# Patient Record
Sex: FEMALE | Race: WHITE | NOT HISPANIC OR LATINO | URBAN - METROPOLITAN AREA
[De-identification: names, ages, dates, MRNs, and addresses within clinical notes are randomized per-mention and may not be internally consistent; named-entity substitution may affect disease eponyms.]

---

## 2017-05-23 ENCOUNTER — OFFICE VISIT - HEALTHEAST (OUTPATIENT)
Dept: FAMILY MEDICINE | Facility: CLINIC | Age: 27
End: 2017-05-23

## 2017-05-23 DIAGNOSIS — N63.15 BREAST LUMP ON RIGHT SIDE AT 9 O'CLOCK POSITION: ICD-10-CM

## 2017-05-23 ASSESSMENT — MIFFLIN-ST. JEOR: SCORE: 1321.43

## 2017-06-12 ENCOUNTER — OFFICE VISIT - HEALTHEAST (OUTPATIENT)
Dept: FAMILY MEDICINE | Facility: CLINIC | Age: 27
End: 2017-06-12

## 2017-06-12 DIAGNOSIS — Z01.419 WELL FEMALE EXAM WITH ROUTINE GYNECOLOGICAL EXAM: ICD-10-CM

## 2017-06-12 DIAGNOSIS — N63.15 BREAST LUMP ON RIGHT SIDE AT 9 O'CLOCK POSITION: ICD-10-CM

## 2017-06-12 ASSESSMENT — MIFFLIN-ST. JEOR: SCORE: 1314.06

## 2017-06-13 ENCOUNTER — COMMUNICATION - HEALTHEAST (OUTPATIENT)
Dept: FAMILY MEDICINE | Facility: CLINIC | Age: 27
End: 2017-06-13

## 2017-06-13 ENCOUNTER — HOSPITAL ENCOUNTER (OUTPATIENT)
Dept: MAMMOGRAPHY | Facility: HOSPITAL | Age: 27
Discharge: HOME | End: 2017-06-13
Attending: NURSE PRACTITIONER

## 2017-06-13 DIAGNOSIS — N63.15 BREAST LUMP ON RIGHT SIDE AT 9 O'CLOCK POSITION: ICD-10-CM

## 2017-06-14 ENCOUNTER — HOSPITAL ENCOUNTER (OUTPATIENT)
Dept: MAMMOGRAPHY | Facility: HOSPITAL | Age: 27
Discharge: HOME | End: 2017-06-14
Attending: NURSE PRACTITIONER

## 2017-06-14 DIAGNOSIS — N63.15 BREAST LUMP ON RIGHT SIDE AT 9 O'CLOCK POSITION: ICD-10-CM

## 2017-06-15 ENCOUNTER — COMMUNICATION - HEALTHEAST (OUTPATIENT)
Dept: ONCOLOGY | Facility: HOSPITAL | Age: 27
End: 2017-06-15

## 2017-06-16 ENCOUNTER — COMMUNICATION - HEALTHEAST (OUTPATIENT)
Dept: FAMILY MEDICINE | Facility: CLINIC | Age: 27
End: 2017-06-16

## 2017-06-16 ENCOUNTER — COMMUNICATION - HEALTHEAST (OUTPATIENT)
Dept: ONCOLOGY | Facility: HOSPITAL | Age: 27
End: 2017-06-16

## 2017-06-19 ENCOUNTER — COMMUNICATION - HEALTHEAST (OUTPATIENT)
Dept: ONCOLOGY | Facility: HOSPITAL | Age: 27
End: 2017-06-19

## 2017-06-20 ENCOUNTER — OFFICE VISIT - HEALTHEAST (OUTPATIENT)
Dept: SURGERY | Facility: CLINIC | Age: 27
End: 2017-06-20

## 2017-06-20 DIAGNOSIS — C50.811 MALIGNANT NEOPLASM OF OVERLAPPING SITES OF RIGHT FEMALE BREAST (H): ICD-10-CM

## 2017-06-21 ENCOUNTER — COMMUNICATION - HEALTHEAST (OUTPATIENT)
Dept: ONCOLOGY | Facility: HOSPITAL | Age: 27
End: 2017-06-21

## 2017-06-22 LAB

## 2017-06-23 ENCOUNTER — OFFICE VISIT - HEALTHEAST (OUTPATIENT)
Dept: ONCOLOGY | Facility: HOSPITAL | Age: 27
End: 2017-06-23

## 2017-06-23 ENCOUNTER — COMMUNICATION - HEALTHEAST (OUTPATIENT)
Dept: ONCOLOGY | Facility: HOSPITAL | Age: 27
End: 2017-06-23

## 2017-06-23 DIAGNOSIS — C50.911 MALIGNANT NEOPLASM OF RIGHT FEMALE BREAST, UNSPECIFIED SITE OF BREAST: ICD-10-CM

## 2017-06-26 ENCOUNTER — COMMUNICATION - HEALTHEAST (OUTPATIENT)
Dept: FAMILY MEDICINE | Facility: CLINIC | Age: 27
End: 2017-06-26

## 2017-06-26 DIAGNOSIS — R87.610 ASCUS OF CERVIX WITH NEGATIVE HIGH RISK HPV: ICD-10-CM

## 2017-06-26 LAB
HPV INTERPRETATION - HISTORICAL: NORMAL
HPV INTERPRETER - HISTORICAL: NORMAL

## 2017-07-05 ENCOUNTER — COMMUNICATION - HEALTHEAST (OUTPATIENT)
Dept: ONCOLOGY | Facility: HOSPITAL | Age: 27
End: 2017-07-05

## 2017-07-05 ENCOUNTER — AMBULATORY - HEALTHEAST (OUTPATIENT)
Dept: ONCOLOGY | Facility: HOSPITAL | Age: 27
End: 2017-07-05

## 2017-07-14 ENCOUNTER — RECORDS - HEALTHEAST (OUTPATIENT)
Dept: ADMINISTRATIVE | Facility: OTHER | Age: 27
End: 2017-07-14

## 2017-07-21 ENCOUNTER — RECORDS - HEALTHEAST (OUTPATIENT)
Dept: ADMINISTRATIVE | Facility: OTHER | Age: 27
End: 2017-07-21

## 2017-07-31 ENCOUNTER — RECORDS - HEALTHEAST (OUTPATIENT)
Dept: ADMINISTRATIVE | Facility: OTHER | Age: 27
End: 2017-07-31

## 2017-08-27 ENCOUNTER — RECORDS - HEALTHEAST (OUTPATIENT)
Dept: ADMINISTRATIVE | Facility: OTHER | Age: 27
End: 2017-08-27

## 2017-09-12 ENCOUNTER — RECORDS - HEALTHEAST (OUTPATIENT)
Dept: ADMINISTRATIVE | Facility: OTHER | Age: 27
End: 2017-09-12

## 2018-01-03 ENCOUNTER — RECORDS - HEALTHEAST (OUTPATIENT)
Dept: ADMINISTRATIVE | Facility: OTHER | Age: 28
End: 2018-01-03

## 2018-02-12 ENCOUNTER — RECORDS - HEALTHEAST (OUTPATIENT)
Dept: ADMINISTRATIVE | Facility: OTHER | Age: 28
End: 2018-02-12

## 2018-02-15 ENCOUNTER — COMMUNICATION - HEALTHEAST (OUTPATIENT)
Dept: FAMILY MEDICINE | Facility: CLINIC | Age: 28
End: 2018-02-15

## 2018-02-15 ENCOUNTER — OFFICE VISIT - HEALTHEAST (OUTPATIENT)
Dept: FAMILY MEDICINE | Facility: CLINIC | Age: 28
End: 2018-02-15

## 2018-02-15 ENCOUNTER — COMMUNICATION - HEALTHEAST (OUTPATIENT)
Dept: NURSING | Facility: CLINIC | Age: 28
End: 2018-02-15

## 2018-02-15 DIAGNOSIS — Z12.4 SCREENING FOR CERVICAL CANCER: ICD-10-CM

## 2018-02-15 DIAGNOSIS — S69.92XA JAMMED INTERPHALANGEAL JOINT OF FINGER OF LEFT HAND, INITIAL ENCOUNTER: ICD-10-CM

## 2018-02-15 RX ORDER — ACETAMINOPHEN 500 MG
1000 TABLET ORAL
Status: SHIPPED | COMMUNITY
Start: 2018-02-15

## 2018-02-15 ASSESSMENT — MIFFLIN-ST. JEOR: SCORE: 1320.86

## 2018-02-19 ENCOUNTER — RECORDS - HEALTHEAST (OUTPATIENT)
Dept: ADMINISTRATIVE | Facility: OTHER | Age: 28
End: 2018-02-19

## 2018-02-19 LAB
BKR LAB AP ABNORMAL BLEEDING: NO
BKR LAB AP BIRTH CONTROL/HORMONES: NORMAL
BKR LAB AP CERVICAL APPEARANCE: NORMAL
BKR LAB AP GYN ADEQUACY: NORMAL
BKR LAB AP GYN INTERPRETATION: NORMAL
BKR LAB AP HPV REFLEX: NORMAL
BKR LAB AP LMP: NORMAL
BKR LAB AP PATIENT STATUS: NORMAL
BKR LAB AP PREVIOUS ABNORMAL: NORMAL
BKR LAB AP PREVIOUS NORMAL: 2015
HIGH RISK?: NO
PATH REPORT.COMMENTS IMP SPEC: NORMAL
RESULT FLAG (HE HISTORICAL CONVERSION): NORMAL

## 2018-02-28 LAB
LAB AP CHARGES (HE HISTORICAL CONVERSION): ABNORMAL
PATH REPORT.ADDENDUM SPEC: ABNORMAL
PATH REPORT.COMMENTS IMP SPEC: ABNORMAL
PATH REPORT.COMMENTS IMP SPEC: ABNORMAL
PATH REPORT.FINAL DX SPEC: ABNORMAL
PATH REPORT.GROSS SPEC: ABNORMAL
PATH REPORT.MICROSCOPIC SPEC OTHER STN: ABNORMAL
PATH REPORT.MICROSCOPIC SPEC OTHER STN: ABNORMAL
PATH REPORT.RELEVANT HX SPEC: ABNORMAL
PATHOLOGY SYNOPTIC REPORT: ABNORMAL
RESULT FLAG (HE HISTORICAL CONVERSION): ABNORMAL

## 2018-05-15 ENCOUNTER — RECORDS - HEALTHEAST (OUTPATIENT)
Dept: ADMINISTRATIVE | Facility: OTHER | Age: 28
End: 2018-05-15

## 2018-09-10 ENCOUNTER — OFFICE VISIT - HEALTHEAST (OUTPATIENT)
Dept: FAMILY MEDICINE | Facility: CLINIC | Age: 28
End: 2018-09-10

## 2018-09-10 DIAGNOSIS — N89.8 VAGINAL ITCHING: ICD-10-CM

## 2018-09-10 DIAGNOSIS — C50.811 MALIGNANT NEOPLASM OF OVERLAPPING SITES OF RIGHT FEMALE BREAST (H): ICD-10-CM

## 2018-09-10 LAB
CLUE CELLS: NORMAL
TRICHOMONAS, WET PREP: NORMAL
YEAST, WET PREP: NORMAL

## 2018-09-10 RX ORDER — TAMOXIFEN CITRATE 20 MG/1
20 TABLET ORAL
Status: SHIPPED | COMMUNITY
Start: 2018-05-20

## 2018-09-10 ASSESSMENT — MIFFLIN-ST. JEOR: SCORE: 1326.3

## 2018-09-11 LAB
C TRACH DNA SPEC QL PROBE+SIG AMP: NEGATIVE
N GONORRHOEA DNA SPEC QL NAA+PROBE: NEGATIVE

## 2018-09-12 LAB — BACTERIA SPEC CULT: NORMAL

## 2019-03-28 ENCOUNTER — RECORDS - HEALTHEAST (OUTPATIENT)
Dept: ADMINISTRATIVE | Facility: OTHER | Age: 29
End: 2019-03-28

## 2019-08-15 ENCOUNTER — RECORDS - HEALTHEAST (OUTPATIENT)
Dept: ADMINISTRATIVE | Facility: OTHER | Age: 29
End: 2019-08-15

## 2019-12-30 ENCOUNTER — RECORDS - HEALTHEAST (OUTPATIENT)
Dept: ADMINISTRATIVE | Facility: OTHER | Age: 29
End: 2019-12-30

## 2020-03-12 ENCOUNTER — RECORDS - HEALTHEAST (OUTPATIENT)
Dept: ADMINISTRATIVE | Facility: OTHER | Age: 30
End: 2020-03-12

## 2021-01-28 ENCOUNTER — AMBULATORY - HEALTHEAST (OUTPATIENT)
Dept: NURSING | Facility: CLINIC | Age: 31
End: 2021-01-28

## 2021-02-18 ENCOUNTER — AMBULATORY - HEALTHEAST (OUTPATIENT)
Dept: NURSING | Facility: CLINIC | Age: 31
End: 2021-02-18

## 2021-05-30 VITALS — HEIGHT: 66 IN | BODY MASS INDEX: 20.89 KG/M2 | WEIGHT: 130 LBS

## 2021-05-31 VITALS — HEIGHT: 66 IN | WEIGHT: 127.5 LBS | BODY MASS INDEX: 20.49 KG/M2

## 2021-05-31 VITALS — HEIGHT: 66 IN | BODY MASS INDEX: 20.73 KG/M2 | WEIGHT: 129 LBS

## 2021-05-31 VITALS — WEIGHT: 129 LBS | BODY MASS INDEX: 20.66 KG/M2

## 2021-06-01 VITALS — HEIGHT: 66 IN | BODY MASS INDEX: 20.93 KG/M2 | WEIGHT: 130.2 LBS

## 2021-06-10 NOTE — PROGRESS NOTES
"  Assessment/Plan:         1. Breast lump on right side at 9 o'clock position      breast lump located on the right outer side of the right breast in the 9 o'clock position it is mobile and firm and round may be slightly met rubbery as well.  Approximately 2 cm x 2 cm.  Likely glandular hormonal change in nature.  Since this was just found after her menstrual cycle I discussed with the patient that it is worthwhile giving it 1 month to see if the lump will change.  Incidentally she is due for an annual exam and this can be reassessed at that time.  I discussed with the patient that further workup of this would include a breast ultrasound.  She is comfortable waiting 1 month to see if any changes to the lump occur.  Patient may certainly follow-up sooner as needed.        Subjective:      Kiersten Parks is a 26 y.o. female who presents for lump in her right breast.  She found this lump 5 days ago when doing a self breast exam.  She reports that she does self breast exams on a irregular basis however does try to do them every couple months.  She noted that she felt a harder spot on the right outside edge of her right breast. No pain associated with this. It was never noticed before.   No other tissue changes, skin dimpling, or other external skin changed noted. The lump moves with palpation.     The following portions of the patient's history were reviewed and updated as appropriate: allergies, current medications and problem list.    Review of Systems   Pertinent items are noted in HPI.      Objective:      /78 (Patient Site: Right Arm, Patient Position: Sitting, Cuff Size: Adult Regular)  Pulse 60  Resp 16  Ht 5' 6\" (1.676 m)  Wt 130 lb (59 kg)  BMI 20.98 kg/m2    General appearance: alert, appears stated age and cooperative  Head: Normocephalic, without obvious abnormality, atraumatic  Breasts: No nipple retraction or dimpling, No nipple discharge or bleeding, No axillary or supraclavicular " adenopathy, positive findings: smooth, round, firm, mobile and non-tender nodule located on the right upper outer quadrant

## 2021-06-11 NOTE — PROGRESS NOTES
Assessment/ Plan:      1. Well female exam with routine gynecological exam  Healthy female exam completed today.  Labs were not completed today. She was encouraged to continue with her healthy lifestyle modifications of getting regular exercise and eating a balanced diet.  Pap smear was updated today despite her having relatively normal Pap last year.  Last use was abnormal stating reparative changes.  Patient does not have any history of gynecologic procedures besides Paps so will recheck Pap today to see if same result is present.  I discussed self breast exams and how to complete these.  She was encouraged to follow-up in 1 year for annual exam or sooner as needed.  - Gynecologic Cytology (PAP Smear)    2. Breast lump on right side at 9 o'clock position  Breast lump that was present 1 month ago continues to be present in the same size.  I would recommend ultrasound to have this assessed further.  This was ordered today. See my last note.   - US Breast Limited (Focal) Right; Future    3. Contraception  Patient was given a refill of her oral contraception today.  - norgestimate-ethinyl estradiol (TRI-SPRINTEC, 28,) 0.18/0.215/0.25 mg-35 mcg (28) Tab tablet; Take 1 tablet by mouth daily.  Dispense: 90 tablet; Refill: 4    Subjective:       Kiersten Parks is a 26 y.o. female who presents for an annual exam.  The patient is sexually active. The patient participates in regular exercise: yes. The patient reports that there is not domestic violence in her life. Breast lump on right side is the same as when last evaluated in May. No change in size or consistency.      Healthy Habits:   Regular Exercise: Yes  Sunscreen Use: Yes  Healthy Diet: Yes  Dental Visits Regularly: No  Seat Belt: Yes  Sexually active: Yes  Self Breast Exam Monthly:Yes  Lipid Profile: No  Glucose Screen: No  Prevention of Osteoporosis: No  Last Dexa: N/A  Guns at Home:  No         Gynecologic History  Patient's last menstrual period was  "2017.  Contraception: OCP (estrogen/progesterone)  Last Pap: . Results were: abnormal    OB History    Para Term  AB Living   0 0       SAB TAB Ectopic Multiple Live Births                    Current Outpatient Prescriptions   Medication Sig Dispense Refill     norgestimate-ethinyl estradiol (TRI-SPRINTEC, 28,) 0.18/0.215/0.25 mg-35 mcg (28) Tab tablet Take 1 tablet by mouth daily. 90 tablet 4     No current facility-administered medications for this visit.      History reviewed. No pertinent past medical history.  History reviewed. No pertinent surgical history.  Review of patient's allergies indicates no known allergies.  Family History   Problem Relation Age of Onset     Diabetes Paternal Grandmother      Cancer Paternal Grandfather      Social History     Social History     Marital status:      Spouse name: N/A     Number of children: 0     Years of education: N/A     Occupational History     childcare center/[email or web address]     Social History Main Topics     Smoking status: Never Smoker     Smokeless tobacco: Not on file     Alcohol use Not on file     Drug use: Not on file     Sexual activity: Yes     Partners: Male     Birth control/ protection: OCP     Other Topics Concern     Not on file     Social History Narrative       Review of Systems  General:  Denies problem  Eyes: Denies problem  Ears/Nose/Throat: Denies problem  Cardiovascular: Denies problem  Respiratory:  Denies problem  Gastrointestinal:  Denies problem  Genitourinary: Denies problem  Musculoskeletal:  Denies problem  Skin: Denies problem  Neurologic: Denies problem  Psychiatric: Denies problem  Endocrine: Denies problem  Heme/Lymphatic: Denies problem   Allergic/Immunologic: Denies problem         Objective:         Vitals:    17 0852   BP: 110/78   Pulse: 72   Resp: 16   Weight: 127 lb 8 oz (57.8 kg)   Height: 5' 6.25\" (1.683 m)     Physical Exam:  General Appearance: Alert, cooperative, no distress, " appears stated age  Head: Normocephalic, without obvious abnormality, atraumatic  Eyes: PERRL, conjunctiva/corneas clear, EOM's intact  Ears: Normal TM's and external ear canals, both ears  Nose: Nares normal, septum midline,mucosa normal, no drainage  Throat: Lips, mucosa, and tongue normal; teeth and gums normal  Neck: Supple, symmetrical, trachea midline, no adenopathy;  thyroid: not enlarged, symmetric, no tenderness/mass/nodules  Back: Symmetric, no curvature, ROM normal, no CVA tenderness  Lungs: Clear to auscultation bilaterally, respirations unlabored  Breasts: Breasts: No nipple retraction or dimpling, No nipple discharge or bleeding, No axillary or supraclavicular adenopathy, positive findings: smooth, round, firm, mobile and non-tender nodule located on the right upper outer quadrant. Size 2cm x 2cm  No breast masses, tenderness, asymmetry, or nipple discharge.  Heart: Regular rate and rhythm, S1 and S2 normal, no murmur, rub, or gallop  Abdomen: Soft, non-tender, bowel sounds active all four quadrants,  no masses, no organomegaly  Pelvic:Normally developed genitalia with no external lesions or eruptions. Vagina and cervix show no lesions, inflammation, discharge or tenderness. No cystocele, No rectocele. Uterus    Non-tender, mobile.  No adnexal mass or tenderness.  Extremities: Extremities normal, atraumatic, no cyanosis or edema  Skin: Skin color, texture, turgor normal, no rashes or lesions  Lymph nodes: Cervical, supraclavicular, and axillary nodes normal  Neurologic: Normal     Results from One year ago. Will repeat PAP this year.     Results for orders placed or performed in visit on 04/18/16   Gynecologic Cytology (PAP Smear)   Result Value Ref Range    Case Report       Gynecologic Cytology Report                       Case: [id number]                                   Authorizing Provider:  Amber Price CNP   Collected:           04/18/2016 0910              Ordering Location:      [facility]    Received:            04/18/2016 0910                                     Medicine                                                                     First Screen:          Ciera Garza, CT (ASCP)                                                     Pathologist:           Pb Carcamo MD                                                         Specimen:    SUREPATH PAP, SCREENING, Endocervical/cervical                                             Interpretation       Negative for squamous intraepithelial lesion or malignancy    Result Flag Abnormal (!) Normal    Other Findings Reparative changes      Specimen Adequacy       Satisfactory for evaluation, endocervical/transformation zone component present    HPV Reflex? Yes if ASCUS     HIGH RISK No     LMP/Menopause Date 4/12/2016     Abnormal Bleeding No     Pt Status N/A     Birth Control/Hormones Pill/patch/ring     Previous Normal/Date 2012     Prev Abn Date/Dx none     Cervical Appearance normal

## 2021-06-11 NOTE — PROGRESS NOTES
This is a 26 y.o.  female who I'm asked to see by Amber Price CNP for evaluation of a right breast cancer.  This was picked up by the patient.  When they did her ultrasound however they could see at least one additional lesion if not another one.  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  It is estrogen receptor positive, progesterone receptor positive and HER-2 negative.    She has no family history of breast cancer.      PAST MEDICAL HISTORY:  Past Medical History:   Diagnosis Date     Breast cancer 06/2017    right breast     Past Surgical History:   Procedure Laterality Date     TONSILLECTOMY AND ADENOIDECTOMY       WISDOM TOOTH EXTRACTION         Medications:    Current Outpatient Prescriptions:      norgestimate-ethinyl estradiol (TRI-SPRINTEC, 28,) 0.18/0.215/0.25 mg-35 mcg (28) Tab tablet, Take 1 tablet by mouth daily., Disp: 90 tablet, Rfl: 4    Allergies:  No Known Allergies    Social History:   reports that she has never smoked. She has never used smokeless tobacco. She reports that she drinks about 1.8 oz of alcohol per week  She reports that she does not use illicit drugs.    ROS:  A 12 point comprehensive review of systems was negative except as noted.    Physical Exam  /66 (Patient Site: Left Arm, Patient Position: Sitting, Cuff Size: Adult Regular)  Pulse 92  Wt 129 lb (58.5 kg)  LMP 05/29/2017  SpO2 95%  BMI 20.66 kg/m2  General:alert, appears stated age and cooperative  Lungs:clear to auscultation bilaterally  CV:regular rate and rhythm, S1, S2 normal, no murmur, click, rub or gallop  Breasts: Palpable mass in the upper outer quadrant of the right breast.  It is very ill-defined and very hard to put a size to it.  I do not feel any other lesions.  There are no skin lesions that I can feel.  No palpable masses on the left.  Lymph Nodes:no axillary nodes palpated  Neuro:Grossly normal      Reviewed her mammograms and ultrasound and pathology.     Impression:  Right Breast Cancer.  Discussed the surgical options for treatment of breast cancer which generally are a lumpectomy (partial mastectomy) combined with radiation versus a mastectomy.  Explained that the survival benefit is the same for both.  The difference is in local recurrence risk.  The patient is a Poor candidate for a lumpectomy.  I explained that because of the multifocal nature and her young age I think she would benefit most by a mastectomy.  I told her she really wanted to try lumpectomy we could but I would be afraid of what her cosmetic outcome would be be and I think her long-term risk of local recurrence is at least 20% or more.  She is already thought about it and she knows at this point that she would best be served with a mastectomy.  Talked a lot about the options for reconstruction.  I have given her the names of plastic surgeons and she is going to get in contact with them soon.  Discussed SLN biopsy.  The procedure and rationale were explained.  Explained that if I did find a node that was positive I would need to take out more lymph nodes.  Discussed that at this point we do not know yet whether or not she will need chemotherapy and we may not know until we get all of the results of surgery back.  Despite her age there is a chance that she will not need chemotherapy but she will at least need hormone therapy and explained to her that as bad as chemotherapy sounds, hormone therapy is not a walk in the park for someone her age either.  We will wait until we get all the results from surgery before sending her onto oncology.      Plan: We'll schedule for a right total mastectomy with sentinel lymph node biopsy.  She would like to combine that with immediate reconstruction which I think is very reasonable.    This is typically an overnight stay in the hospital.  The risks and benefits of surgery were explained.  Also talked about expected recovery time.  Have also scheduled for an MRI to get a  better look at both breasts and have referred to genetics for genetic testing.

## 2021-06-11 NOTE — PROGRESS NOTES
[facility] GENETIC COUNSELING: CONSULT SUMMARY  Patient: Kiersten Parks (1990 / 26 y.o., female) MRN: [id number]   [address]  [facility] [address]      Date/Location of Visit: 6/23/2017, [facility]  Care Provider: Nhi Howard MS, [facility] ([phone], [email or web address])  Referring Provider: Yolie Muñoz MD  Present: Kiersten and her  Elder    PRESENTING CONCERN: Recently diagnosed breast cancer at age 26    MEDICAL: Kiersten had ultrasound and biopsy of two areas in the right breast last week.  Pathology confirms invasive ductal carcinoma with associated DCIS, grade 2, estrogen and progesterone receptors positive and HER2/giulia-negative.  She met with Dr. Muñoz on 6/20/2017 and was recommended right mastectomy due to multifocal disease and young age.  She was able to meet with Dr. Rubio in plastic surgery already as well.  Kiersten is scheduled for a second surgical opinion with Dr. Fraire at [facility] and bilateral breast MRI here on 6/30/17.      Other cancer screening: No colonoscopy to date.    Hormone/reproductive: Uterus and ovaries remain in place.  No pregnancy history.  Interested in fertility preservation.    Exposures: No history of smoking.      Prior genetic testing: None.    No prior transplant or recent transfusion.    FAMILY: Three-generation pedigree was taken to assess cancer risk using information provided by Kiersten and is scanned into her chart.        Significant for: Paternal grandfather was diagnosed with prostate cancer in his late 60s.  The paternal family history is otherwise notable for being predominantly male.  Kiersten has 4 younger brothers and 4 paternal uncles; no sisters and only one paternal aunt.  Kiersten does have some female paternal first-cousins but she is actually the oldest of her cousins, the rest range in age from 11 to early 20s.  No family history is known beyond her paternal grandparents; her grandmother was adopted and  her grandfather came from a large family but Kiersten is not familiar with those relatives.    Genetic testing: None.    The importance of accurate and verified history was emphasized.  In evaluating Kiersten's personal and family history we discussed differences between sporadic, environmental, and genetic contributions to cancer risk including relevant genetic principals and inheritance patterns.  I emphasized that the factors underlying cancer diagnosis are often multiple and complex.      ASSESSMENT:  Kiersten meets NCCN guidelines for hereditary cancer testing including BRCA1/2 based on her young age and limited paternal family structure.  This can be done in the context of a multi-gene panel to include analysis of other relevant breast cancer genes.  Stat turnaround is available for a panel of 9 genes most strongly linked to breast cancer and most pertinent to surgical planning.     USE OF RESULTS:  We reviewed likelihood of possible results of genetic testing: positive, negative, and a variant of uncertain significance along with medical and social/psychological considerations. We reviewed cancer risks associated with hereditary predisposition and options for prevention and/or heightened surveillance if positive.  For example, women with inherited risk for breast cancer generally have a higher than average chance to develop a second primary (new) breast cancer following initial diagnosis and would be offered the option of bilateral mastectomy or screening with breast MRIs and mammograms.  Results are therefore relevant to Kiersten's upcoming breast surgery.  We discussed the difference between risk of recurrence versus risk of a second primary breast cancer, with testing to inform the latter but not the former.    With panel testing results may identify risks across a spectrum of cancer types and of varying magnitude including those that would be unexpected in the context of Kiersten's personal/family history.  Use of  results may be limited in the case of ambiguous findings, findings in poorly characterized genes, or involving cancers for which prevention or surveillance is not possible.  Risks and recommendations associated with both new and established genes may evolve over time.  Limitations of a negative result were emphasized including possibility of risk being linked to one or more other genes or mutations not detected by current methodologies.  She understands that given her age, concern for an underlying but unidentified genetic risk will remain even with negative results.    FAMILY:  Reviewed autosomal dominant inheritance.  If a mutation is identified targeted testing will be available to relatives and the importance of Kiersten sharing this with them was emphasized.  There would be a 50% chance of passing on a mutation in any future pregnancy.      PSYCHOSOCIAL/DECISION-MAKING:  Kiersten expressed no hesitations or concerns as it pertains to genetic testing or her ability to cope with results.  We discussed resources available to her at [facility].  She is interested in fertility preservation and has already obtained resources from Jenny Page.  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      OTHER: Collection or assessment of other aspects of the family history not related to cancer risk were beyond the scope of our discussion today.    PLAN: Kiersten opted to pursue genetic testing for hereditary cancer today.       Test: 9-gene STAT breast cancer panel (BRCA1, BRCA2, PTEN, TP53, STK11, CDH1, SAMMY, CHEK2, PALB2)    Consent: verbal and written    Sample: saliva    Laboratory: [facility]    Results: expected in 5-12 days and disclosure planned by phone; Kiersten is encouraged to contact me in the event of delays    It was a pleasure to meet Kiersten today and to  participate in her care.  I am of course happy to address any follow-up questions/concerns should these arise for Kiersten or her providers. Kiersten should contact our office at least annually for updates or with changes to personal/family history as the clinic does not routinely re-contact individual patients with an increase or change in knowledge.            RAMIN LAWRENCE MS, [facility]  Certified Genetic Counselor  [facility]    Handouts or Enclosures:  contact information    Face-to-face time: 60 minutes    cc:   MD Amber Clinton CNP Tracy Kayan, MD Natasha Rueth, MD

## 2021-06-11 NOTE — PROGRESS NOTES
[facility] GENETIC COUNSELING: RESULT SUMMARY  Patient: Kiersten Parks (1990 / 26 y.o., female)   MRN: [id number]   [address]  [facility] [address]    Results Disclosure On: 7/5/2017    Method of Disclosure: telephone   Care Provider: Nhi Howard MS, [facility] [place] ([phone], [email or web address])    GENETIC TEST RESULT:  NEGATIVE (no mutations or uncertain variants identified)    TEST PERFORMED: 9-gene breast cancer STAT panel  LABORATORY: [employer]  GENES/CONDITIONS ANALYZED:     Hereditary Breast and Ovarian Cancer syndrome (BRCA1 and BRCA2)    Cowden syndrome (PTEN), includes deletion/duplication of promoter region     Li Fraumeni syndrome (TP53), includes deletion/duplication of promoter region    Peutz-Jeghers syndrome (STK11)    Hereditary Diffuse Gastric Cancer syndrome (CDH1)     Defective double-stranded DNA break repair (SAMMY, CHEK2, PALB2)    TECHNICAL NOTES: Testing includes sequencing and deletion/duplication analysis using Next-generation sequencing technology and additional methods as necessary, unless otherwise noted.  Variants expected to be benign (i.e., harmless) are not reported.  See supplement to results for more detailed description of laboratory methodology.      PERTINENT HISTORY: Kiersten was recently diagnosed with breast cancer at age 26.  Since initial work-up at [facility] she has sought a second opinion with Brenda Mcclelland and tells me she had a positive lymph node biopsy.  She also had bilateral MRI through their system and tells me it was as expected, the left breast was normal.  Kiersten is planning neoadjuvant chemotherapy with Dr. Garcia and will then follow up for surgery with Dr. Fraire.     Kiersten's paternal grandfather had prostate cancer in his 60s.  Otherwise there are no known cancers in the extended paternal or maternal lineages.  The paternal side is fairly limited in structure; Kiersten has no sisters and only one paternal aunt.  See  pedigree.    LIMITATIONS:  Interpretation of any findings is based on current scientific knowledge/standards and may evolve over time.  Kiersten's testing is expected to detect the majority of mutations in the analyzed genes although it is possible some mutations are missed with current methodology.  It is also still possible Kiersten could have risk linked to one or more other genes not included in this test.  I have emphasized that given her very young age suspicion for an underlying but unidentified genetic cause remains.    ADDITIONAL TESTING:  No additional genetic testing is currently indicated for Kiersten although both knowledge and available testing/technology are likely to improve with time. DNA banking is currently available to preserve genetic information for future generations.    CONTINUED CARE:  For Kiersten, treatment for her breast cancer as well as continued surveillance for new cancers should continue per the recommendations of her healthcare providers.  Kiersten's risk of a second primary breast cancer is expected to be at least 0.4% to 0.5% per year (4-5% over the next 10 years) based on age at first diagnosis and estrogen receptor status.      FAMILY:  Relatives are encouraged to discuss the family history and cancer surveillance with their individual care providers as negative genetic testing for Kiersten does not eliminate cancer risk to her relatives.  Kiersten's mother should ensure she is up to date with breast screening.  Any future daughters for Kiersten would be recommended to begin breast imaging at an early age, perhaps as early as age 17 although individual discussion about timing and modalities for screening should precede any imaging since this would be quite young.  Surveillance for Kiersten's paternal first-cousins (related to her only through male relatives) should be determined on an individual basis as well.    PLAN: No further testing or follow-up in our program is planned for Kiersten at this  time.  Kiersten should contact our office at least annually for updates or with changes to personal/family history as we do not routinely re-contact individual patients with an increase or change in knowledge.    Enclosed:  Copy of results     Pedigree         RAMIN LAWRENCE MS, [facility]  Certified Genetic Counselor  [facility]      cc:   Kiersten Price, MD Salome Pastrana MD ([facility])   Mary Garcia MD ([facility])

## 2021-06-16 PROBLEM — C50.911 MALIGNANT NEOPLASM OF RIGHT FEMALE BREAST (H): Status: ACTIVE | Noted: 2018-02-15

## 2021-06-16 NOTE — PROGRESS NOTES
Assessment/Plan:     1. Screening for cervical cancer  Pap smear was completed today for cervical cancer screening.  She will be notified of the results of this.  I will also collect an WAYNE to get colposcopy results from [facility].  - Gynecologic Cytology (PAP Smear)    2. Jammed interphalangeal joint of finger of left hand, initial encounter  Attempted to decrease swelling in finger by utilizing tape.  Unfortunately this was not successfully removed.  I advised patient to continue with ice as well as cool water and further attempts at removing the ring.  I discussed utilizing string to help decrease size of finger to get the ring off.  Advised patient that if ring does not come off and she starts having worsening swelling that she would need to have the ring removed.  I discussed with patient that eventually this will decrease in swelling and she will eventually be able to remove the ring I advised patient to just monitor for worsening symptoms of swelling and occlusion.  She is in agreement this plan she will let me know if she has any questions.    Subjective:      Kiersten Parks is a 27 y.o. female who presents for pap smear follow up.  She had a colposcopy completed at [facility].  She does not recall what the results are she is completing an WAYNE so we can collect this.  She was recommended to have a repeat Pap in 6 months.  She presents for that today.  In addition she mentions that she jammed her left ring finger and it is now swollen and she is unable to get the ring off.  It is not turning purple and she still has feeling in it she does have pain with range of motion however is able to start bending her finger.  She also reports that she is currently considered cancer free and is now done with chemotherapy and is doing radiation.  She is currently on tamoxifen.    The following portions of the patient's history were reviewed and updated as appropriate: allergies, current medications and problem  "list.    Review of Systems   Pertinent items are noted in HPI.      Objective:      /68  Pulse 84  Ht 5' 6.25\" (1.683 m)  Wt 129 lb (58.5 kg)  LMP 06/15/2017  BMI 20.66 kg/m2    General appearance: alert, appears stated age and cooperative  Head: Normocephalic, without obvious abnormality, atraumatic  Pelvic: cervix normal in appearance, external genitalia normal, no adnexal masses or tenderness, no cervical motion tenderness, rectovaginal septum normal, uterus normal size, shape, and consistency and vagina normal without discharge  Extremities: Left ring finger is swelling and redness is present.  Unable to remove ring.  I applied tape around the finger in attempt to decrease swelling.  Unfortunately, ring was unable to be removed.  Patient is able to bend the fingers some however there is limitations to the swelling and pain.  Pain is located in the MIP joint.  Neurologic: Grossly normal  Psych: Speech is fluent and thought process is linear, affect is reactive and appropriate    "

## 2021-06-20 NOTE — PROGRESS NOTES
Assessment / Impression     1. Vaginal itching  Wet Prep, Vaginal    Chlamydia trachomatis & Neisseria gonorrhoeae, Amplified Detection    Culture, Wound   2. Malignant neoplasm of overlapping sites of right female breast (H)             Plan:     I reviewed today's negative wet prep results with patient.  Given her symptoms, I did go ahead and collect a culture, results are pending.  Will also screen for gonorrhea and chlamydia.  However I did also review Micromedex for Tamoxifen, which does show tamoxifen may cause pruritus of vulva in 2% of patients.  She will review this adverse effect with her oncologist.  If all testing today is normal and her symptoms are persisting, could consider referral to OB/GYN for further evaluation.  For now, she may try topical Vagisil to see if this may help symptoms.    Of note, patient will be traveling internationally, leaving evening of 9/12/18, will try to inform patient of results before then if we do have them, I did discuss with her that I am not in the office on Wednesdays, she may call the clinic, or review my chart if needed.    Subjective:      HPI: Kiersten Parks is a 28 y.o. female, new to me, with a history of breast cancer, started tamoxifen in April 2018, who presents for vaginal itching and burning sensation.  Symptoms have been ongoing for about 2 weeks.  She was seen at urgent care through [facility], wet prep and UA were unremarkable aside from ketones.  At that time, she also is experiencing some shortness of breath, though she does say that occurs with anxiety.  She did have EKG done which showed normal sinus rhythm as well as, Chem-8 and CBC done which was overall normal aside from mild hypokalemia.  Chest x-ray was also done which was clear with minimal adjacent fibrosis.    Today, patient recurs as she continues to note significant vulvar and vaginal itching.  She is currently sexually active.  She is using condoms for contraception, however does not  "believe she is using a new brand.  Does not recall any new soaps or changes in tampons or pads.      She will be traveling [place] to [facility] and [place] on September 12, returning the 22nd.      Medical History:     Patient Active Problem List   Diagnosis     Malignant neoplasm of right female breast (H)       Past Medical History:   Diagnosis Date     Breast cancer (H) 06/2017    right breast       Past Surgical History:   Procedure Laterality Date     BREAST BIOPSY       TONSILLECTOMY AND ADENOIDECTOMY       WISDOM TOOTH EXTRACTION         Current Medications:     Current Outpatient Prescriptions   Medication Sig Note     acetaminophen (TYLENOL) 500 MG tablet Take 1,000 mg by mouth. 2/15/2018: Received from: [facility] Received Sig: Take 1,000 mg by mouth. Max acetaminophen dose: 4000mg in 24 hrs.     tamoxifen (NOLVADEX) 20 MG tablet Take 20 mg by mouth. 9/10/2018: Received from: [facility] Received Sig: Take 20 mg by mouth once daily.       Family History:     Family History   Problem Relation Age of Onset     Diabetes Paternal Grandmother      Heart disease Paternal Grandmother      Prostate cancer Paternal Grandfather      diagnosed in his late 60s     No Medical Problems Mother      No Medical Problems Father      No Medical Problems Brother      No Medical Problems Brother      No Medical Problems Brother      No Medical Problems Brother        Review of Systems  All other systems reviewed and are negative.         Social History:     History   Smoking Status     Never Smoker   Smokeless Tobacco     Never Used     Social History     Social History Narrative         Objective:     /66 (Patient Site: Right Arm, Patient Position: Sitting, Cuff Size: Adult Regular)  Pulse 72  Resp 16  Ht 5' 6.25\" (1.683 m)  Wt 130 lb 3.2 oz (59.1 kg)  BMI 20.86 kg/m2  Physical Examination: General appearance - alert, well appearing, and in no " distress  Eyes: pupils equal and reactive, extraocular eye movements intact  : Normal external female genitalia, normal-appearing vaginal miramontes.  Cervix appears normal.  There is moderate amount of white discharge noted.  Bimanual exam did not produce any adnexal tenderness.  No masses appreciated.  Neurological: alert, oriented, normal speech, no focal findings or movement disorder noted.    Extremities: No edema, no clubbing or cyanosis  Psychiatric: Normal affect. Does not appear anxious or depressed.    Recent Results (from the past 168 hour(s))   Wet Prep, Vaginal   Result Value Ref Range    Yeast Result No yeast seen No yeast seen    Trichomonas No Trichomonas seen No Trichomonas seen    Clue Cells, Wet Prep No Clue cells seen No Clue cells seen         Paulette Cannon MD  9/10/2018  11:36 AM

## 2021-08-14 ENCOUNTER — HEALTH MAINTENANCE LETTER (OUTPATIENT)
Age: 31
End: 2021-08-14

## 2021-10-09 ENCOUNTER — HEALTH MAINTENANCE LETTER (OUTPATIENT)
Age: 31
End: 2021-10-09

## 2022-09-17 ENCOUNTER — HEALTH MAINTENANCE LETTER (OUTPATIENT)
Age: 32
End: 2022-09-17

## 2023-10-07 ENCOUNTER — HEALTH MAINTENANCE LETTER (OUTPATIENT)
Age: 33
End: 2023-10-07

## 2024-02-24 ENCOUNTER — HEALTH MAINTENANCE LETTER (OUTPATIENT)
Age: 34
End: 2024-02-24